# Patient Record
Sex: FEMALE | Race: WHITE | NOT HISPANIC OR LATINO | Employment: OTHER | RURAL
[De-identification: names, ages, dates, MRNs, and addresses within clinical notes are randomized per-mention and may not be internally consistent; named-entity substitution may affect disease eponyms.]

---

## 2022-12-09 NOTE — PROGRESS NOTES
Subjective:       Patient ID: Gregoria Nieves is a 87 y.o. female.    Chief Complaint:  No chief complaint on file.      History of Present Illness  This pleasant 87 year old right handed  female presents to clinic with her sitter as a new patient referral from Dr. HUEY Palmer for ataxic gait. The sitter provided most of the history. She has a daughter that lives in [place], [place] and a son that lives in [place]. Neither one was present at today's visit. The sitter states that the patient's gait difficulty started in July 2022 and has progressively gotten worse. She reports that the patient's legs are weak and that she cannot stand very long. She states it seems like her legs give out. She denies that the patient has had any falls and does not ambulate at home. They deny any signs or symptoms of a stroke. They deny any associated SOB or chest pains with the gait difficulty. The sitter states that Dr. Palmer treats the patient for dementia and migraines. They deny any migraines in the last year and the sitter reports the patient's memory is stable on Aricept and Namenda. She reports the patient sleeps good and eats good. Medical records show her PMH includes migraines, dementia, depression, anxiety, gastric ulcer, HTN, hypothyroidism, diverticulitis, macular degeneration, OA, osteoporosis, insomnia, and vitamin d deficiency. Her family medical history includes MI, colon cancer, Cardiovascular disease, migraines and HTN. She denies smoking or drinking alcohol. I spoke with her daughter MS Villalobos and discussed the plan with her. She reports the patient had a hip replacement surgery in or around 2010. She states that the patient should be able to have an MRI. Written education was given for fall precautions. Discussed the plan in detail with Neurologist Dr. SOUMYA Parish, the patient, the sitter and the daughter by phone and they are in agreement with the plan. All their questions were answered at today's visit.       Review  of Systems  Review of Systems   Constitutional:  Negative for activity change, diaphoresis, fever and unexpected weight change.   HENT:  Negative for congestion, ear pain, facial swelling, hearing loss, tinnitus, trouble swallowing and voice change.    Eyes:  Negative for photophobia, pain and visual disturbance.   Respiratory:  Negative for chest tightness, shortness of breath and wheezing.    Cardiovascular:  Negative for chest pain, palpitations and leg swelling.   Gastrointestinal:  Negative for constipation, diarrhea, nausea and vomiting.   Genitourinary:  Negative for difficulty urinating.   Musculoskeletal:  Positive for gait problem. Negative for back pain, neck pain and neck stiffness.   Skin:  Negative for color change, pallor, rash and wound.   Neurological:  Negative for dizziness, tremors, seizures, syncope, facial asymmetry, speech difficulty, weakness, light-headedness, numbness and headaches.   Psychiatric/Behavioral:  Negative for agitation, behavioral problems, confusion, decreased concentration and hallucinations. The patient is not nervous/anxious and is not hyperactive.      Objective:      Neurologic Exam     Mental Status   Oriented to person.   Oriented to place.   Disoriented to time.   Attention: decreased. Concentration: decreased.   Speech: speech is normal   Level of consciousness: alert    History of dementia      Cranial Nerves     CN II   Visual fields full to confrontation.     CN III, IV, VI   Pupils are equal, round, and reactive to light.  Extraocular motions are normal.   Right pupil: Size: 3 mm. Shape: regular. Reactivity: brisk.   Left pupil: Size: 3 mm. Shape: regular. Reactivity: brisk.   CN III: no CN III palsy  CN VI: no CN VI palsy    CN V   Facial sensation intact.     CN VII   Facial expression full, symmetric.     CN VIII   CN VIII normal.   Hearing: intact    CN IX, X   CN IX normal.   CN X normal.     CN XI   CN XI normal.     CN XII   CN XII normal.     Motor Exam    Muscle bulk: normal  Overall muscle tone: normal  Right arm pronator drift: absent  Left arm pronator drift: absent    Strength   Right deltoid: 5/5  Left deltoid: 5/5  Right biceps: 5/5  Left biceps: 5/5  Right triceps: 5/5  Left triceps: 5/5  Right quadriceps: 3/5  Left quadriceps: 3/5  Right hamstring: 3/5  Left hamstring: 3/5    Sensory Exam   Light touch normal.   Right leg vibration: decreased from knee  Left leg vibration: decreased from knee  Proprioception normal.   Right leg pinprick: decreased from knee  Left leg pinprick: decreased from knee    Gait, Coordination, and Reflexes     Coordination   Romberg: positive  Finger to nose coordination: normal    Tremor   Resting tremor: absent  Intention tremor: absent  Action tremor: absent    Reflexes   Right brachioradialis: 2+  Left brachioradialis: 2+  Right biceps: 2+  Left biceps: 2+  Right triceps: 2+  Left triceps: 2+  Right patellar: 2+  Left patellar: 2+  Right achilles: 2+  Left achilles: 2+  Right : 4+  Left : 4+  Patient is in a wheelchair, unable to stand unassisted.      Physical Exam  Constitutional:       General: She is not in acute distress.  HENT:      Head: Normocephalic.      Nose: Nose normal.      Mouth/Throat:      Mouth: Mucous membranes are moist.   Eyes:      Extraocular Movements: Extraocular movements intact and EOM normal.      Pupils: Pupils are equal, round, and reactive to light.   Cardiovascular:      Rate and Rhythm: Normal rate and regular rhythm.      Heart sounds: Normal heart sounds. No murmur heard.  Pulmonary:      Effort: Pulmonary effort is normal. No respiratory distress.      Breath sounds: Normal breath sounds. No wheezing, rhonchi or rales.   Musculoskeletal:         General: No swelling, tenderness, deformity or signs of injury. Normal range of motion.      Cervical back: Normal range of motion. No rigidity or tenderness.      Right lower leg: No edema.      Left lower leg: No edema.   Skin:     General:  Skin is warm and dry.      Capillary Refill: Capillary refill takes less than 2 seconds.      Coloration: Skin is not jaundiced or pale.      Findings: No erythema, lesion or rash.   Neurological:      Mental Status: She is alert.      Coordination: Romberg Test abnormal. Finger-Nose-Finger Test normal.      Deep Tendon Reflexes:      Reflex Scores:       Tricep reflexes are 2+ on the right side and 2+ on the left side.       Bicep reflexes are 2+ on the right side and 2+ on the left side.       Brachioradialis reflexes are 2+ on the right side and 2+ on the left side.       Patellar reflexes are 2+ on the right side and 2+ on the left side.       Achilles reflexes are 2+ on the right side and 2+ on the left side.  Psychiatric:         Mood and Affect: Mood normal.         Speech: Speech normal.         Behavior: Behavior normal. Behavior is cooperative.         Thought Content: Thought content normal.         Cognition and Memory: Memory is impaired.         Assessment:     Problem List Items Addressed This Visit          Orthopedic    Weakness of both lower extremities    Relevant Orders    CBC Auto Differential    Comprehensive Metabolic Panel    Vitamin B12 & Folate    TSH    T4, Free    Vitamin D    MRI Brain W WO Contrast    MRI Cervical Spine W WO Cont       Palliative Care    On long term drug therapy    Relevant Orders    CBC Auto Differential    Comprehensive Metabolic Panel    Vitamin B12 & Folate    TSH    T4, Free    Vitamin D       Other    Gait difficulty - Primary    Relevant Orders    CBC Auto Differential    Comprehensive Metabolic Panel    Vitamin B12 & Folate    TSH    T4, Free    Vitamin D    MRI Brain W WO Contrast    MRI Cervical Spine W WO Cont         Plan:     Fall precautions   Labs: cbc, cmp, b12, folate, tsh, free t4, vitamin d   MRI of the brain with and without contrast to evaluate leg weakness and gait difficulty  MRI of the cervical spine with and without contrast to evaluate leg  weakness and gait difficulty  Physical and Occupational therapy per Dr. Palmer   Regular follow up with Dr. Palmer for medical management   Follow up with neurology in 2 months or sooner if needed

## 2022-12-14 ENCOUNTER — OFFICE VISIT (OUTPATIENT)
Dept: NEUROLOGY | Facility: CLINIC | Age: 87
End: 2022-12-14
Payer: MEDICARE

## 2022-12-14 VITALS
DIASTOLIC BLOOD PRESSURE: 76 MMHG | HEIGHT: 66 IN | BODY MASS INDEX: 19.93 KG/M2 | WEIGHT: 124 LBS | OXYGEN SATURATION: 96 % | SYSTOLIC BLOOD PRESSURE: 124 MMHG | HEART RATE: 84 BPM

## 2022-12-14 DIAGNOSIS — R29.898 WEAKNESS OF BOTH LOWER EXTREMITIES: ICD-10-CM

## 2022-12-14 DIAGNOSIS — R26.9 GAIT DIFFICULTY: Primary | ICD-10-CM

## 2022-12-14 DIAGNOSIS — Z79.899 ON LONG TERM DRUG THERAPY: ICD-10-CM

## 2022-12-14 PROBLEM — E55.9 VITAMIN D DEFICIENCY: Status: ACTIVE | Noted: 2022-12-14

## 2022-12-14 PROBLEM — F41.8 DEPRESSION WITH ANXIETY: Status: ACTIVE | Noted: 2022-12-14

## 2022-12-14 PROBLEM — F03.90 DEMENTIA WITHOUT BEHAVIORAL DISTURBANCE: Status: ACTIVE | Noted: 2018-08-23

## 2022-12-14 PROBLEM — K64.9 HEMORRHOIDS: Status: ACTIVE | Noted: 2022-12-14

## 2022-12-14 PROBLEM — G47.00 INSOMNIA: Status: ACTIVE | Noted: 2022-12-14

## 2022-12-14 PROBLEM — J30.9 ALLERGIC RHINITIS: Status: ACTIVE | Noted: 2018-11-16

## 2022-12-14 PROBLEM — M19.90 ARTHRITIS: Status: ACTIVE | Noted: 2022-12-14

## 2022-12-14 PROBLEM — K25.9 GASTRIC ULCER: Status: ACTIVE | Noted: 2022-12-14

## 2022-12-14 PROBLEM — G43.909 MIGRAINE HEADACHE: Status: ACTIVE | Noted: 2022-12-14

## 2022-12-14 PROBLEM — E03.9 HYPOTHYROIDISM: Status: ACTIVE | Noted: 2022-12-14

## 2022-12-14 PROBLEM — M81.0 OSTEOPOROSIS: Status: ACTIVE | Noted: 2022-12-14

## 2022-12-14 PROBLEM — H35.30 MACULAR DEGENERATION: Status: ACTIVE | Noted: 2022-12-14

## 2022-12-14 PROBLEM — I10 HYPERTENSION: Status: ACTIVE | Noted: 2019-08-18

## 2022-12-14 PROCEDURE — 99205 OFFICE O/P NEW HI 60 MIN: CPT | Mod: PBBFAC | Performed by: NURSE PRACTITIONER

## 2022-12-14 PROCEDURE — 99203 OFFICE O/P NEW LOW 30 MIN: CPT | Mod: S$PBB,,, | Performed by: NURSE PRACTITIONER

## 2022-12-14 PROCEDURE — 99203 PR OFFICE/OUTPT VISIT, NEW, LEVL III, 30-44 MIN: ICD-10-PCS | Mod: S$PBB,,, | Performed by: NURSE PRACTITIONER

## 2022-12-14 RX ORDER — MIRTAZAPINE 15 MG/1
TABLET, FILM COATED ORAL
COMMUNITY
Start: 2022-10-27

## 2022-12-14 RX ORDER — DONEPEZIL HYDROCHLORIDE 10 MG/1
TABLET, FILM COATED ORAL
COMMUNITY
Start: 2022-10-27

## 2022-12-14 RX ORDER — MEMANTINE HYDROCHLORIDE 10 MG/1
TABLET ORAL
COMMUNITY
Start: 2022-10-20

## 2022-12-14 RX ORDER — ERGOCALCIFEROL 1.25 MG/1
50000 CAPSULE ORAL
COMMUNITY
Start: 2022-10-12

## 2022-12-14 RX ORDER — TOPIRAMATE 200 MG/1
TABLET ORAL
COMMUNITY
Start: 2022-10-20

## 2022-12-14 RX ORDER — CALCIUM CARBONATE 600 MG
600 TABLET ORAL ONCE
COMMUNITY

## 2022-12-14 RX ORDER — SUMATRIPTAN 50 MG/1
TABLET, FILM COATED ORAL
COMMUNITY
Start: 2022-11-13

## 2022-12-14 RX ORDER — ALENDRONATE SODIUM 70 MG/1
TABLET ORAL
COMMUNITY
Start: 2022-12-12

## 2022-12-14 RX ORDER — POTASSIUM CHLORIDE 750 MG/1
TABLET, EXTENDED RELEASE ORAL
COMMUNITY
Start: 2022-10-07

## 2022-12-14 RX ORDER — LEVOTHYROXINE SODIUM 88 UG/1
TABLET ORAL
COMMUNITY
Start: 2022-11-10

## 2022-12-14 RX ORDER — ASPIRIN 81 MG/1
81 TABLET ORAL DAILY
COMMUNITY

## 2022-12-14 NOTE — PATIENT INSTRUCTIONS
Fall precautions   Labs: cbc, cmp, b12, folate, tsh, free t4, vitamin d   MRI of the brain with and without contrast to evaluate leg weakness and gait difficulty  MRI of the cervical spine with and without contrast to evaluate leg weakness and gait difficulty  Physical and Occupational therapy per Dr. Palmer   Regular follow up with Dr. Palmer for medical management   Follow up with neurology in 2 months or sooner if needed

## 2023-05-03 ENCOUNTER — OFFICE VISIT (OUTPATIENT)
Dept: NEUROLOGY | Facility: CLINIC | Age: 88
End: 2023-05-03
Payer: COMMERCIAL

## 2023-05-03 VITALS
SYSTOLIC BLOOD PRESSURE: 102 MMHG | WEIGHT: 115 LBS | HEIGHT: 66 IN | HEART RATE: 76 BPM | DIASTOLIC BLOOD PRESSURE: 64 MMHG | OXYGEN SATURATION: 96 % | RESPIRATION RATE: 18 BRPM | BODY MASS INDEX: 18.48 KG/M2

## 2023-05-03 DIAGNOSIS — F03.90 DEMENTIA WITHOUT BEHAVIORAL DISTURBANCE: Primary | ICD-10-CM

## 2023-05-03 PROCEDURE — 99214 PR OFFICE/OUTPT VISIT, EST, LEVL IV, 30-39 MIN: ICD-10-PCS | Mod: S$PBB,,, | Performed by: PSYCHIATRY & NEUROLOGY

## 2023-05-03 PROCEDURE — 99214 OFFICE O/P EST MOD 30 MIN: CPT | Mod: S$PBB,,, | Performed by: PSYCHIATRY & NEUROLOGY

## 2023-05-03 PROCEDURE — 99215 OFFICE O/P EST HI 40 MIN: CPT | Mod: PBBFAC | Performed by: PSYCHIATRY & NEUROLOGY

## 2023-05-03 RX ORDER — HYDROCORTISONE 25 MG/G
CREAM TOPICAL
COMMUNITY
Start: 2023-03-15

## 2023-05-03 RX ORDER — OLANZAPINE 2.5 MG/1
2.5 TABLET ORAL DAILY
COMMUNITY
Start: 2023-04-20

## 2023-05-03 RX ORDER — MUPIROCIN 20 MG/G
OINTMENT TOPICAL
COMMUNITY
Start: 2022-01-04

## 2023-05-03 RX ORDER — VENLAFAXINE HYDROCHLORIDE 150 MG/1
150 CAPSULE, EXTENDED RELEASE ORAL DAILY
COMMUNITY
Start: 2023-04-16

## 2023-05-03 NOTE — PATIENT INSTRUCTIONS
Excellent family support   Continue memantine and aricept  Consider decreasing topamax to 100mg bid  Follow up in 6 months

## 2023-05-03 NOTE — PROGRESS NOTES
Subjective:       Patient ID: Gregoria Nieves is a 87 y.o. female     Chief Complaint:    Chief Complaint   Patient presents with    Gait Problem     Pt has weakness in leg and some migraines        Allergies:  Morphine sulfate, Penicillins, and Sulfa dyne    Current Medications:    Outpatient Encounter Medications as of 5/3/2023   Medication Sig Dispense Refill    alendronate (FOSAMAX) 70 MG tablet       aspirin (ECOTRIN) 81 MG EC tablet Take 81 mg by mouth once daily.      calcium carbonate (OS-ERICA) 600 mg calcium (1,500 mg) Tab Take 600 mg by mouth once.      donepeziL (ARICEPT) 10 MG tablet       ergocalciferol (ERGOCALCIFEROL) 50,000 unit Cap Take 50,000 Units by mouth every 7 days.      hydrocortisone 2.5 % cream APPLY TO AFFECTED AREAS ON FACE AND BODY TWICE A DAY FOR ITCHING AND IRRITATION      memantine (NAMENDA) 10 MG Tab       mirtazapine (REMERON) 15 MG tablet       mupirocin (BACTROBAN) 2 % ointment Mupirocin 2% External Ointment QTY: 22 gram Days: 30 Refills: 0  Written: 01/04/22 Patient Instructions: twice a day      OLANZapine (ZYPREXA) 2.5 MG tablet Take 2.5 mg by mouth once daily.      potassium chloride SA (K-DUR,KLOR-CON M) 10 MEQ tablet       sumatriptan (IMITREX) 50 MG tablet TAKE 1 TABLET BY MOUTH AT ONSET OF MIGRAINE AS DIRECTED      SYNTHROID 88 mcg tablet       topiramate (TOPAMAX) 200 MG Tab       venlafaxine (EFFEXOR-XR) 150 MG Cp24 Take 150 mg by mouth once daily.      vit C/E/Zn/coppr/lutein/zeaxan (PRESERVISION AREDS-2 ORAL) Take by mouth.       No facility-administered encounter medications on file as of 5/3/2023.       History of Present Illness  86 yo WF presenting with gradual memory loss and cognitive decline. She has trouble ambulating per family and has not walked in years.   Pt is accompanied by family members and caregiver in the room. Ataxic gait noted by family.   Pt also managed by Geriatric- Psych, and on psych meds for behavior and mood regulation.  Unknown family hx of  "dementia due to parents passing away young from other conditions.            Past Medical History:   Diagnosis Date    Cancer     Headache     Hearing loss     Hypertension     Memory loss     Seizures        Past Surgical History:   Procedure Laterality Date    ANASTOMOSIS, CAVOPULMONARY, FOR SECOND SUPERIOR VENA CAVA       SECTION      HIP REPLACEMENT ARTHROPLASTY      HYSTERECTOMY         Social History  Ms. Agent  reports that she has never smoked. She has never used smokeless tobacco. She reports that she does not drink alcohol and does not use drugs.    Family History  Ms.'s Agent family history includes Cancer in her father and maternal aunt; Heart disease in her mother.    Review of Systems  Review of Systems   Musculoskeletal:  Positive for joint pain and neck pain.   Psychiatric/Behavioral:  Positive for memory loss.    All other systems reviewed and are negative.   Objective:   /64 (BP Location: Left arm, Patient Position: Sitting, BP Method: Large (Manual))   Pulse 76   Resp 18   Ht 5' 6" (1.676 m)   Wt 52.2 kg (115 lb)   SpO2 96%   BMI 18.56 kg/m²    NEUROLOGICAL EXAMINATION:     MENTAL STATUS   Oriented to person.   Oriented to place.   Disoriented to year, month and date.   Speech: speech is normal        Signs of dementia present      CRANIAL NERVES   Cranial nerves II through XII intact.     MOTOR EXAM   Muscle bulk: normal  Overall muscle tone: normal       Pt unable to stand and walk on her own due to dyskinesia and ataxia in lower extremities     REFLEXES     Reflexes   Right brachioradialis: 2+  Left brachioradialis: 2+  Right biceps: 2+  Left biceps: 2+  Right triceps: 2+  Left triceps: 2+  Right patellar: 2+  Left patellar: 2+  Right achilles: 2+  Left achilles: 2+  Right plantar: normal  Left plantar: normal    GAIT AND COORDINATION        Pt uses wheelchair to ambulate      Physical Exam  Vitals reviewed.   Constitutional:       Appearance: Normal appearance. She is " normal weight.   Cardiovascular:      Rate and Rhythm: Normal rate.   Neurological:      General: No focal deficit present.      Mental Status: Mental status is at baseline. She is disoriented.      Cranial Nerves: Cranial nerves 2-12 are intact.      Deep Tendon Reflexes:      Reflex Scores:       Tricep reflexes are 2+ on the right side and 2+ on the left side.       Bicep reflexes are 2+ on the right side and 2+ on the left side.       Brachioradialis reflexes are 2+ on the right side and 2+ on the left side.       Patellar reflexes are 2+ on the right side and 2+ on the left side.       Achilles reflexes are 2+ on the right side and 2+ on the left side.  Psychiatric:         Speech: Speech normal.        Assessment:     Dementia without behavioral disturbance         Primary Diagnosis and ICD10  Dementia without behavioral disturbance [F03.90]    Plan:     Patient Instructions   Excellent family support   Continue memantine and aricept  Consider decreasing topamax to 100mg bid  Follow up in 6 months    There are no discontinued medications.    Requested Prescriptions      No prescriptions requested or ordered in this encounter